# Patient Record
Sex: FEMALE
[De-identification: names, ages, dates, MRNs, and addresses within clinical notes are randomized per-mention and may not be internally consistent; named-entity substitution may affect disease eponyms.]

---

## 2020-08-10 ENCOUNTER — NURSE TRIAGE (OUTPATIENT)
Dept: OTHER | Facility: CLINIC | Age: 40
End: 2020-08-10

## 2020-08-10 NOTE — TELEPHONE ENCOUNTER
Call received through Covid-19 hot line. She has aches, headache, diarrhea, abdominal pain and fatigue. She states she was required by her employer to call covid hot line. She will quarantine and call PCP. Recommend using the [place] [employer] [facility] [employer]'s web site for testing locations and information. Please do not reply to the triage nurse through this encounter. Any subsequent communication should be directly with the patient.      Reason for Disposition   [1] Mild body aches, chills, diarrhea, headache, runny nose, or sore throat AND [2] within 14 days of COVID-19 EXPOSURE    Protocols used: CORONAVIRUS (COVID-19) - EXPOSURE-ADULT-